# Patient Record
Sex: FEMALE
[De-identification: names, ages, dates, MRNs, and addresses within clinical notes are randomized per-mention and may not be internally consistent; named-entity substitution may affect disease eponyms.]

---

## 2022-03-27 ENCOUNTER — NURSE TRIAGE (OUTPATIENT)
Dept: OTHER | Facility: CLINIC | Age: 22
End: 2022-03-27

## 2022-03-28 NOTE — TELEPHONE ENCOUNTER
Subjective: Caller states \"29 weeks pregnant, having pain in my upper abdomen area. \"      Ate small snack 4 hours ago    Current Symptoms: abdominal pain    Onset: 1 hour     Associated Symptoms: NA    Pain Severity: 7/10; sharp; constant    Temperature: \"no warmer than usual\"     What has been tried: nothing    LMP: NA Pregnant: Yes    Recommended disposition: L&D    Care advice provided, patient verbalizes understanding; denies any other questions or concerns; instructed to call back for any new or worsening symptoms. L&D    This triage is a result of a call to [facility]. Please do not respond to the triage nurse through this encounter. Any subsequent communication should be directly with the patient.     Reason for Disposition   MODERATE-SEVERE abdominal pain (e.g., interferes with normal activities, awakens from sleep)    Protocols used: PREGNANCY - ABDOMINAL PAIN GREATER THAN 20 WEEKS EGA-ADULT-

## 2022-08-30 ENCOUNTER — NURSE TRIAGE (OUTPATIENT)
Dept: OTHER | Facility: CLINIC | Age: 22
End: 2022-08-30

## 2022-08-31 NOTE — TELEPHONE ENCOUNTER
Subjective: Caller states \"I was dx with mastitis and started abx\"     Current Symptoms: /85   , now, dizziness, weakness, fever, R breast red and inflamed     Onset: 2 days ago; gradual    Associated Symptoms: irritability , fussiness    Pain Severity: 5/10; N/A; none    Temperature: 101, now axillary    What has been tried: Abx, tylenol, rest    LMP:  09/05/2021  Pregnant: No    Recommended disposition: See PCP within 24 Hours    Care advice provided, patient verbalizes understanding; denies any other questions or concerns; instructed to call back for any new or worsening symptoms. Patient/caller agrees to follow-up with PCP     This triage is a result of a call to [address] [place] [address]. Please do not respond to the triage nurse through this encounter. Any subsequent communication should be directly with the patient.      Reason for Disposition   [1] Breast looks infected (spreading redness, feels hot or painful to touch) AND [2] no fever    Protocols used: Breast Symptoms-ADULT-